# Patient Record
Sex: MALE | Race: WHITE | NOT HISPANIC OR LATINO | URBAN - METROPOLITAN AREA
[De-identification: names, ages, dates, MRNs, and addresses within clinical notes are randomized per-mention and may not be internally consistent; named-entity substitution may affect disease eponyms.]

---
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
## 2021-09-10 ENCOUNTER — TRANSCRIPTION ENCOUNTER (OUTPATIENT)
Age: 21
End: 2021-09-10

## 2021-09-18 ENCOUNTER — TRANSCRIPTION ENCOUNTER (OUTPATIENT)
Age: 21
End: 2021-09-18

## 2021-10-02 ENCOUNTER — APPOINTMENT (OUTPATIENT)
Dept: DISASTER EMERGENCY | Facility: OTHER | Age: 21
End: 2021-10-02

## 2021-10-16 ENCOUNTER — TRANSCRIPTION ENCOUNTER (OUTPATIENT)
Age: 21
End: 2021-10-16

## 2021-10-23 ENCOUNTER — APPOINTMENT (OUTPATIENT)
Dept: DISASTER EMERGENCY | Facility: OTHER | Age: 21
End: 2021-10-23

## 2021-10-23 ENCOUNTER — TRANSCRIPTION ENCOUNTER (OUTPATIENT)
Age: 21
End: 2021-10-23

## 2023-01-23 ENCOUNTER — APPOINTMENT (OUTPATIENT)
Dept: OTOLARYNGOLOGY | Facility: CLINIC | Age: 23
End: 2023-01-23
Payer: COMMERCIAL

## 2023-01-23 VITALS
HEART RATE: 102 BPM | OXYGEN SATURATION: 99 % | BODY MASS INDEX: 36.96 KG/M2 | DIASTOLIC BLOOD PRESSURE: 68 MMHG | WEIGHT: 230 LBS | TEMPERATURE: 97.4 F | RESPIRATION RATE: 14 BRPM | HEIGHT: 66 IN | SYSTOLIC BLOOD PRESSURE: 110 MMHG

## 2023-01-23 DIAGNOSIS — F41.9 ANXIETY DISORDER, UNSPECIFIED: ICD-10-CM

## 2023-01-23 DIAGNOSIS — R51.9 HEADACHE, UNSPECIFIED: ICD-10-CM

## 2023-01-23 DIAGNOSIS — Z72.0 TOBACCO USE: ICD-10-CM

## 2023-01-23 DIAGNOSIS — F32.A ANXIETY DISORDER, UNSPECIFIED: ICD-10-CM

## 2023-01-23 DIAGNOSIS — Z83.49 FAMILY HISTORY OF OTHER ENDOCRINE, NUTRITIONAL AND METABOLIC DISEASES: ICD-10-CM

## 2023-01-23 DIAGNOSIS — Z78.9 OTHER SPECIFIED HEALTH STATUS: ICD-10-CM

## 2023-01-23 DIAGNOSIS — R06.89 OTHER ABNORMALITIES OF BREATHING: ICD-10-CM

## 2023-01-23 DIAGNOSIS — H60.93 UNSPECIFIED OTITIS EXTERNA, BILATERAL: ICD-10-CM

## 2023-01-23 DIAGNOSIS — H93.13 TINNITUS, BILATERAL: ICD-10-CM

## 2023-01-23 DIAGNOSIS — Z82.0 FAMILY HISTORY OF EPILEPSY AND OTHER DISEASES OF THE NERVOUS SYSTEM: ICD-10-CM

## 2023-01-23 PROCEDURE — 99205 OFFICE O/P NEW HI 60 MIN: CPT | Mod: 25

## 2023-01-23 PROCEDURE — G0268 REMOVAL OF IMPACTED WAX MD: CPT

## 2023-01-23 PROCEDURE — 31231 NASAL ENDOSCOPY DX: CPT

## 2023-01-23 NOTE — PHYSICAL EXAM
[] : septum deviated to the left [Midline] : trachea located in midline position [Normal] : no rashes [FreeTextEntry1] : Unable to breathe through nose tinnitus [de-identified] : Bilateral cerumen impactrion [de-identified] : Hypertrophy

## 2023-01-23 NOTE — CONSULT LETTER
[Dear  ___] : Dear  [unfilled], [Courtesy Letter:] : I had the pleasure of seeing your patient, [unfilled], in my office today. [Please see my note below.] : Please see my note below. [Sincerely,] : Sincerely, [FreeTextEntry2] : Jonna Tapia [FreeTextEntry3] : Alin Fountain MD, PATRIC, FACS\par  Department Otolaryngology\par Director of [facility]\par Professor of Otolaryngology, \par [employer]\par

## 2023-01-23 NOTE — HISTORY OF PRESENT ILLNESS
[Facial Pain] : facial pain [Headache] : headache [Facial Pressure] : facial pressure [de-identified] : 23 year old male here for nasal congestion, sinus headaches, intermittent bilateral tinnitus.  States has had symptoms for years.  Denies sinus pain, sinus pressure, post nasal drip or nasal discharge.  Denies sinus infections in the past year.  \par States intermittent bilateral tinnitus for years.  Patient denies otalgia, otorrhea, ear infections, hearing loss, dizziness, vertigo, headaches related to hearing.  No family history of hearing loss. \par

## 2023-01-23 NOTE — REASON FOR VISIT
[Initial Evaluation] : an initial evaluation for [FreeTextEntry2] : here for nasal congestion, sinus headaches, intermittent bilateral tinnitus

## 2023-01-23 NOTE — PROCEDURE
[Video Captured] : video captured and filed [Topical Lidocaine] : topical lidocaine [Oxymetazoline HCl] : oxymetazoline HCl [Flexible Endoscope] : examined with the flexible endoscope [Serial Number: ___] : Serial Number: [unfilled] [Congested] : congested [___ % Obstructed] : [unfilled]U% obstructed [Deviated to the Lt] : deviated to the left [Normal] : the paranasal sinuses had no abnormalities [Image(s) Captured] : image(s) captured and filed [Risk and Benefits Discussed] : The purpose, risks, discomforts, benefits and alternatives of the procedure have been explained to the patient including no treatment. [Cerumen Impaction] : Cerumen Impaction [] : Removal of Cerumen [FreeTextEntry1] : Patient complains of bilateral tinnitus found to have a bilateral cerumen impaction as well as external otitis after cerumen was removed [FreeTextEntry6] : Cerumen removed with a combination of ear speculum wax loop magnification and irrigation external canals were inflamed

## 2023-01-31 ENCOUNTER — APPOINTMENT (OUTPATIENT)
Dept: CT IMAGING | Facility: HOSPITAL | Age: 23
End: 2023-01-31
Payer: COMMERCIAL

## 2023-01-31 ENCOUNTER — OUTPATIENT (OUTPATIENT)
Dept: OUTPATIENT SERVICES | Facility: HOSPITAL | Age: 23
LOS: 1 days | End: 2023-01-31
Payer: COMMERCIAL

## 2023-01-31 DIAGNOSIS — R51.9 HEADACHE, UNSPECIFIED: ICD-10-CM

## 2023-01-31 DIAGNOSIS — J34.2 DEVIATED NASAL SEPTUM: ICD-10-CM

## 2023-01-31 PROCEDURE — 70486 CT MAXILLOFACIAL W/O DYE: CPT | Mod: 26

## 2023-01-31 PROCEDURE — 70486 CT MAXILLOFACIAL W/O DYE: CPT

## 2023-02-02 RX ORDER — FLUTICASONE PROPIONATE 50 UG/1
50 SPRAY, METERED NASAL DAILY
Qty: 1 | Refills: 5 | Status: ACTIVE | COMMUNITY
Start: 2023-01-23 | End: 1900-01-01

## 2023-02-07 RX ORDER — SULFACETAMIDE SODIUM AND PREDNISOLONE SODIUM PHOSPHATE 100; 2.3 MG/ML; MG/ML
10-0.23 SOLUTION/ DROPS OPHTHALMIC TWICE DAILY
Qty: 1 | Refills: 4 | Status: DISCONTINUED | COMMUNITY
Start: 2023-02-02 | End: 2023-02-07

## 2023-02-07 RX ORDER — CIPROFLOXACIN AND DEXAMETHASONE 3; 1 MG/ML; MG/ML
0.3-0.1 SUSPENSION/ DROPS AURICULAR (OTIC) TWICE DAILY
Qty: 1 | Refills: 4 | Status: ACTIVE | COMMUNITY
Start: 2023-01-23 | End: 1900-01-01

## 2023-03-03 ENCOUNTER — APPOINTMENT (OUTPATIENT)
Dept: OTOLARYNGOLOGY | Facility: CLINIC | Age: 23
End: 2023-03-03
Payer: COMMERCIAL

## 2023-03-03 VITALS
HEART RATE: 90 BPM | SYSTOLIC BLOOD PRESSURE: 114 MMHG | BODY MASS INDEX: 39.24 KG/M2 | DIASTOLIC BLOOD PRESSURE: 80 MMHG | WEIGHT: 250 LBS | HEIGHT: 67 IN

## 2023-03-03 PROCEDURE — 99214 OFFICE O/P EST MOD 30 MIN: CPT | Mod: 25,57

## 2023-03-03 PROCEDURE — 31231 NASAL ENDOSCOPY DX: CPT

## 2023-03-03 PROCEDURE — 92557 COMPREHENSIVE HEARING TEST: CPT

## 2023-03-03 PROCEDURE — 92567 TYMPANOMETRY: CPT

## 2023-03-03 NOTE — HISTORY OF PRESENT ILLNESS
[de-identified] : 23 year old male follow up for nasal congestion, sinus headaches, intermittent bilateral tinnitus.   has been using the ear drops since last visit Jan 23, 2023.    ears are still itchy.   has not had a chance to get the hearing test.     has been using the Flonase daily for the past 4 weeks.  feels the Flonase has provided a small improvement in nasal symptoms. \par Cat scan 1/31/23 IMPRESSION:\par 1. Paranasal sinus mucosal thickening.\par 2. Narrowing of sinus drainage pathways.\par 3. Leftward nasal septal deviation.

## 2023-03-03 NOTE — PHYSICAL EXAM
[] : septum deviated to the left [Midline] : trachea located in midline position [Normal] : no rashes [FreeTextEntry1] : Unable to breathe through nose tinnitus [de-identified] : Bilateral cerumen impactrion [de-identified] : Hypertrophy, R middle turbinate ruth bulllosa

## 2023-03-03 NOTE — PROCEDURE
[Image(s) Captured] : image(s) captured and filed [Video Captured] : video captured and filed [Topical Lidocaine] : topical lidocaine [Oxymetazoline HCl] : oxymetazoline HCl [Flexible Endoscope] : examined with the flexible endoscope [Serial Number: ___] : Serial Number: [unfilled] [Recalcitrant Symptoms] : recalcitrant symptoms  [Anterior rhinoscopy insufficient to account for symptoms] : anterior rhinoscopy insufficient to account for symptoms [Congested] : congested [Nasal Mucosa Crusts / Sores] : no lesions [Nasal Mucosa] : no polyps [___ % Obstructed] : [unfilled]U% obstructed [Deviated to the Lt] : deviated to the left [Nasal Septum] : no lesions [Nasal Septum Mucosa Bleeding] : no bleeding [Normal] : the middle meatus had no abnormalities [FreeTextEntry6] : Pre-op indication(s): nasal congestion, sinus headaches\par Post-op indication(s): deviated septum to the L, R middle turbinate ruth bullosa\par Verbal consent obtained from patient.\par “Anterior rhinoscopy insufficient to account for symptoms” \par Details for procedure: \par Scope #: [id number]\par Type of scope:    flexible fiber optic telescope  X   Rigid glass telescope \par Anesthesia and/or vasoconstriction was achieved topically by using: \par 4% Lidocaine spray   0.05% Oxymetazoline     Other ______ \par The following anatomic sites were directly examined in a sequential fashion: \par The scope was introduced in the nasal passage between the middle and inferior turbinates to exam the inferior portion of the middle meatus and the fontanelle, as well as the maxillary ostia. Next, the scope was passed medially and posteriorly to the middle turbinates to examine the sphenoethmoid recess and the superior turbinate region. \par Upon visualization the finders are as follows: \par Nasal Septum:   Deviated to   left  \par Bleeding site cauterized:    Anterior   left   right   Posterior   left   right \par Method:   Silver Nitrate   YAG Laser    Electrocautery ______ \par Right Side: \par * Mucosa: Normal\par * Mucous: Normal\par * Polyp: Normal\par * Inferior Turbinate: hypertrophy\par * Middle Turbinate: hypertrophy\par * Superior Turbinate: Normal\par * Inferior Meatus: Normal\par * Middle Meatus: Normal\par * Super Meatus: Normal\par * Sphenoethmoidal Recess: Normal\par Left Side: \par * Mucosa: Normal\par * Mucous: Normal\par * Polyp: Normal\par * Inferior Turbinate: hypertrophy\par * Middle Turbinate: Normal\par * Superior Turbinate: Normal\par * Inferior Meatus: Normal\par * Middle Meatus: Normal\par * Super Meatus: Normal\par * Sphenoethmoidal Recess: Normal\par The patient tolerated the procedure well without any complications.\par \par   [FreeTextEntry5] : sinus drainage regions narrowed

## 2023-03-03 NOTE — CONSULT LETTER
[Dear  ___] : Dear  [unfilled], [Courtesy Letter:] : I had the pleasure of seeing your patient, [unfilled], in my office today. [Please see my note below.] : Please see my note below. [Sincerely,] : Sincerely, [FreeTextEntry2] : Jonna Tapia  [FreeTextEntry3] : Alin Fountain MD, PATRIC, FACS\par  Department Otolaryngology\par Director of [facility]\par Professor of Otolaryngology, \par [employer]/\[employer]"" [employer]\par

## 2023-03-03 NOTE — REASON FOR VISIT
[Subsequent Evaluation] : a subsequent evaluation for [FreeTextEntry2] : follow up for nasal congestion, sinus headaches, intermittent bilateral tinnitus

## 2023-04-06 ENCOUNTER — NON-APPOINTMENT (OUTPATIENT)
Age: 23
End: 2023-04-06

## 2023-05-09 ENCOUNTER — OUTPATIENT (OUTPATIENT)
Dept: OUTPATIENT SERVICES | Facility: HOSPITAL | Age: 23
LOS: 1 days | End: 2023-05-09

## 2023-05-09 VITALS
WEIGHT: 227.08 LBS | SYSTOLIC BLOOD PRESSURE: 114 MMHG | DIASTOLIC BLOOD PRESSURE: 74 MMHG | HEIGHT: 66.5 IN | TEMPERATURE: 98 F | OXYGEN SATURATION: 96 % | HEART RATE: 82 BPM | RESPIRATION RATE: 16 BRPM

## 2023-05-09 DIAGNOSIS — J34.2 DEVIATED NASAL SEPTUM: ICD-10-CM

## 2023-05-09 DIAGNOSIS — J32.9 CHRONIC SINUSITIS, UNSPECIFIED: ICD-10-CM

## 2023-05-09 LAB
HCT VFR BLD CALC: 49.8 % — SIGNIFICANT CHANGE UP (ref 39–50)
HGB BLD-MCNC: 16.4 G/DL — SIGNIFICANT CHANGE UP (ref 13–17)
MCHC RBC-ENTMCNC: 27.8 PG — SIGNIFICANT CHANGE UP (ref 27–34)
MCHC RBC-ENTMCNC: 32.9 GM/DL — SIGNIFICANT CHANGE UP (ref 32–36)
MCV RBC AUTO: 84.6 FL — SIGNIFICANT CHANGE UP (ref 80–100)
NRBC # BLD: 0 /100 WBCS — SIGNIFICANT CHANGE UP (ref 0–0)
NRBC # FLD: 0 K/UL — SIGNIFICANT CHANGE UP (ref 0–0)
PLATELET # BLD AUTO: 290 K/UL — SIGNIFICANT CHANGE UP (ref 150–400)
RBC # BLD: 5.89 M/UL — HIGH (ref 4.2–5.8)
RBC # FLD: 12.3 % — SIGNIFICANT CHANGE UP (ref 10.3–14.5)
WBC # BLD: 5.96 K/UL — SIGNIFICANT CHANGE UP (ref 3.8–10.5)
WBC # FLD AUTO: 5.96 K/UL — SIGNIFICANT CHANGE UP (ref 3.8–10.5)

## 2023-05-09 NOTE — H&P PST ADULT - ASSESSMENT
frontal sinusitis deviated septum. patient is scheduled for septoplasty turbinectomy endoscopic CT guided functional endoscopic sinus surgery    frontal sinusitis deviated septum. Patient is scheduled for septoplasty turbinectomy endoscopic CT guided functional endoscopic sinus surgery

## 2023-05-09 NOTE — H&P PST ADULT - HISTORY OF PRESENT ILLNESS
Patient is 23 year old male with pre op dx of chronic frontal sinusitis deviated septum. patient is scheduled for septoplasty turbinectomy endoscopic CT guided functional endoscopic sinus surgery

## 2023-05-09 NOTE — H&P PST ADULT - NECK
normal/supple/symmetric/no tracheal deviation/no thyromegaly/no palpable masses Mallampati Score I/normal/supple/symmetric/no tracheal deviation/no thyromegaly/no palpable masses

## 2023-05-09 NOTE — H&P PST ADULT - NSICDXPASTMEDICALHX_GEN_ALL_CORE_FT
PAST MEDICAL HISTORY:  Chronic frontal sinusitis     Chronic sinusitis     Chronic sphenoidal sinusitis     Deviated septum

## 2023-05-09 NOTE — H&P PST ADULT - GENITOURINARY
· CKD 3a stable    Results from last 7 days   Lab Units 04/27/21  0544 04/26/21  0601 04/25/21  1243   BUN mg/dL 14 17 21   CREATININE mg/dL 1 05 1 05 1 29*   EGFR ml/min/1 73sq m 51 51 40 negative

## 2023-05-09 NOTE — H&P PST ADULT - PROBLEM SELECTOR PLAN 1
Patient tentatively scheduled for septoplasty turbinectomy endoscopic CT guided functional endoscopic sinus surgery     Pre-op instructions provided. Pt given verbal and written instructions with teach back on Pepcid. Pt verbalized understanding with return demonstration.    Pt has a scheduled preop COVID test.

## 2023-05-17 ENCOUNTER — APPOINTMENT (OUTPATIENT)
Dept: CT IMAGING | Facility: CLINIC | Age: 23
End: 2023-05-17
Payer: COMMERCIAL

## 2023-05-17 ENCOUNTER — OUTPATIENT (OUTPATIENT)
Dept: OUTPATIENT SERVICES | Facility: HOSPITAL | Age: 23
LOS: 1 days | End: 2023-05-17
Payer: COMMERCIAL

## 2023-05-17 ENCOUNTER — TRANSCRIPTION ENCOUNTER (OUTPATIENT)
Age: 23
End: 2023-05-17

## 2023-05-17 VITALS
SYSTOLIC BLOOD PRESSURE: 114 MMHG | DIASTOLIC BLOOD PRESSURE: 74 MMHG | HEART RATE: 82 BPM | TEMPERATURE: 98 F | OXYGEN SATURATION: 96 % | RESPIRATION RATE: 16 BRPM | HEIGHT: 66.5 IN | WEIGHT: 227.08 LBS

## 2023-05-17 DIAGNOSIS — R51.9 HEADACHE, UNSPECIFIED: ICD-10-CM

## 2023-05-17 DIAGNOSIS — R09.81 NASAL CONGESTION: ICD-10-CM

## 2023-05-17 DIAGNOSIS — J34.2 DEVIATED NASAL SEPTUM: ICD-10-CM

## 2023-05-17 PROBLEM — J32.3 CHRONIC SPHENOIDAL SINUSITIS: Chronic | Status: ACTIVE | Noted: 2023-05-09

## 2023-05-17 PROBLEM — J32.1 CHRONIC FRONTAL SINUSITIS: Chronic | Status: ACTIVE | Noted: 2023-05-09

## 2023-05-17 PROBLEM — J32.9 CHRONIC SINUSITIS, UNSPECIFIED: Chronic | Status: ACTIVE | Noted: 2023-05-09

## 2023-05-17 PROCEDURE — 70486 CT MAXILLOFACIAL W/O DYE: CPT

## 2023-05-17 PROCEDURE — 70486 CT MAXILLOFACIAL W/O DYE: CPT | Mod: 26

## 2023-05-17 NOTE — ASU PREOPERATIVE ASSESSMENT, ADULT ([facility] ONLY) - FALL HARM RISK - UNIVERSAL INTERVENTIONS
Bed in lowest position, wheels locked, appropriate side rails in place/Call bell, personal items and telephone in reach/Instruct patient to call for assistance before getting out of bed or chair/Non-slip footwear when patient is out of bed/[place] to call system/Physically safe environment - no spills, clutter or unnecessary equipment/Purposeful Proactive Rounding/Room/bathroom lighting operational, light cord in reach

## 2023-05-18 ENCOUNTER — APPOINTMENT (OUTPATIENT)
Dept: OTOLARYNGOLOGY | Facility: AMBULATORY SURGERY CENTER | Age: 23
End: 2023-05-18

## 2023-05-18 ENCOUNTER — OUTPATIENT (OUTPATIENT)
Dept: OUTPATIENT SERVICES | Facility: HOSPITAL | Age: 23
LOS: 1 days | Discharge: HOME | End: 2023-05-18
Payer: COMMERCIAL

## 2023-05-18 ENCOUNTER — TRANSCRIPTION ENCOUNTER (OUTPATIENT)
Age: 23
End: 2023-05-18

## 2023-05-18 ENCOUNTER — RESULT REVIEW (OUTPATIENT)
Age: 23
End: 2023-05-18

## 2023-05-18 VITALS
TEMPERATURE: 99 F | SYSTOLIC BLOOD PRESSURE: 128 MMHG | OXYGEN SATURATION: 97 % | HEART RATE: 91 BPM | DIASTOLIC BLOOD PRESSURE: 58 MMHG | RESPIRATION RATE: 16 BRPM

## 2023-05-18 DIAGNOSIS — J34.2 DEVIATED NASAL SEPTUM: ICD-10-CM

## 2023-05-18 LAB
GRAM STN FLD: SIGNIFICANT CHANGE UP
SPECIMEN SOURCE: SIGNIFICANT CHANGE UP

## 2023-05-18 PROCEDURE — 30520 REPAIR OF NASAL SEPTUM: CPT | Mod: GC

## 2023-05-18 PROCEDURE — 88305 TISSUE EXAM BY PATHOLOGIST: CPT | Mod: 26

## 2023-05-18 PROCEDURE — 31288 NASAL/SINUS ENDOSCOPY SURG: CPT | Mod: GC,LT

## 2023-05-18 PROCEDURE — 61782 SCAN PROC CRANIAL EXTRA: CPT | Mod: GC

## 2023-05-18 PROCEDURE — 30130 EXCISE INFERIOR TURBINATE: CPT | Mod: 50,GC

## 2023-05-18 PROCEDURE — 31253 NSL/SINS NDSC TOTAL: CPT | Mod: GC,LT

## 2023-05-18 PROCEDURE — 88304 TISSUE EXAM BY PATHOLOGIST: CPT | Mod: 26

## 2023-05-18 PROCEDURE — 31267 ENDOSCOPY MAXILLARY SINUS: CPT | Mod: 50,GC

## 2023-05-18 PROCEDURE — 88311 DECALCIFY TISSUE: CPT | Mod: 26

## 2023-05-18 DEVICE — SURGIFLO MATRIX WITH THROMBIN KIT: Type: IMPLANTABLE DEVICE | Status: FUNCTIONAL

## 2023-05-18 DEVICE — STENT SINUS DRUG ELUDING PROPEL CONTOUR: Type: IMPLANTABLE DEVICE | Status: FUNCTIONAL

## 2023-05-18 DEVICE — SEEKR BLN EM FRNT 70 DEG 7X7MM: Type: IMPLANTABLE DEVICE | Status: FUNCTIONAL

## 2023-05-18 DEVICE — STENT DRUG ELUTING SINUS INTERSECT ENT PROPEL MINI 16MM: Type: IMPLANTABLE DEVICE | Status: FUNCTIONAL

## 2023-05-18 DEVICE — STENT DRUG ELUTING SINUS BIO ABSORB INTERSECT ENT PROPEL 23MM: Type: IMPLANTABLE DEVICE | Status: FUNCTIONAL

## 2023-05-18 RX ORDER — OXYCODONE AND ACETAMINOPHEN 5; 325 MG/1; MG/1
5-325 TABLET ORAL
Qty: 10 | Refills: 0 | Status: ACTIVE | COMMUNITY
Start: 2023-05-18 | End: 1900-01-01

## 2023-05-18 RX ORDER — CEPHALEXIN 500 MG/1
500 TABLET ORAL
Qty: 14 | Refills: 0 | Status: ACTIVE | COMMUNITY
Start: 2023-05-18 | End: 1900-01-01

## 2023-05-18 NOTE — ASU DISCHARGE PLAN (ADULT/PEDIATRIC) - ASU DC SPECIAL INSTRUCTIONSFT
please come to clinic tomorrow to get your nasal packing removed    take percocet and keflex for antibiotics and pain

## 2023-05-18 NOTE — ASU DISCHARGE PLAN (ADULT/PEDIATRIC) - NURSING INSTRUCTIONS
Please do not take tylenol AND percocet/vicodin/excedrin as narcotic prescription already contains tylenol.

## 2023-05-18 NOTE — ASU DISCHARGE PLAN (ADULT/PEDIATRIC) - NS MD DC FALL RISK RISK
For information on Fall & Injury Prevention, visit: [email or web address] OR  [email or web address] OR  https://www.cdc.gov/steadi/patient.html

## 2023-05-18 NOTE — ASU DISCHARGE PLAN (ADULT/PEDIATRIC) - CALL YOUR DOCTOR IF YOU HAVE ANY OF THE FOLLOWING:
Bleeding that does not stop/Pain not relieved by Medications Bleeding that does not stop/Pain not relieved by Medications/Fever greater than (need to indicate Fahrenheit or Celsius)/Wound/Surgical Site with redness, or foul smelling discharge or pus/Nausea and vomiting that does not stop

## 2023-05-19 ENCOUNTER — APPOINTMENT (OUTPATIENT)
Dept: OTOLARYNGOLOGY | Facility: CLINIC | Age: 23
End: 2023-05-19
Payer: COMMERCIAL

## 2023-05-19 VITALS — TEMPERATURE: 98.2 F

## 2023-05-19 PROCEDURE — 99024 POSTOP FOLLOW-UP VISIT: CPT

## 2023-05-19 RX ORDER — OXYCODONE 5 MG/1
5 TABLET ORAL EVERY 6 HOURS
Qty: 2 | Refills: 0 | Status: ACTIVE | COMMUNITY
Start: 2023-05-19 | End: 1900-01-01

## 2023-05-19 NOTE — HISTORY OF PRESENT ILLNESS
[de-identified] : 22 yo s/p septoplasty, turbinectomy and endoscopic ct guided fess  on 5/18/2023. Presents today for nasal packing removal . Reports dry mouth, pain controlled with medication.

## 2023-05-23 LAB
CULTURE RESULTS: SIGNIFICANT CHANGE UP
SPECIMEN SOURCE: SIGNIFICANT CHANGE UP

## 2023-05-24 ENCOUNTER — APPOINTMENT (OUTPATIENT)
Dept: OTOLARYNGOLOGY | Facility: CLINIC | Age: 23
End: 2023-05-24
Payer: COMMERCIAL

## 2023-05-24 VITALS
SYSTOLIC BLOOD PRESSURE: 116 MMHG | DIASTOLIC BLOOD PRESSURE: 70 MMHG | OXYGEN SATURATION: 98 % | TEMPERATURE: 97.1 F | HEIGHT: 67 IN | HEART RATE: 88 BPM | WEIGHT: 250 LBS | BODY MASS INDEX: 39.24 KG/M2

## 2023-05-24 DIAGNOSIS — J34.2 DEVIATED NASAL SEPTUM: ICD-10-CM

## 2023-05-24 PROCEDURE — 99024 POSTOP FOLLOW-UP VISIT: CPT

## 2023-05-24 NOTE — ASSESSMENT
[FreeTextEntry1] : -  Continue saline spray\par -  Patient is on a flight next week and will follow up in 2 weeks for debridement.

## 2023-05-24 NOTE — REVIEW OF SYSTEMS
[As Noted in HPI] : as noted in HPI [Nasal Congestion] : nasal congestion [Nose Bleeds] : no nose bleeds [Negative] : Heme/Lymph

## 2023-05-24 NOTE — HISTORY OF PRESENT ILLNESS
[de-identified] : Mr. YANEZ is a 23 year male who presents one week post septoplasty, turbinectomy, FESS.  He is doing well and is without any complaints.  He reports that he has been using saline rinses every day and has completed his antibiotics.  Denies any pain or discomfort.

## 2023-05-24 NOTE — PHYSICAL EXAM
[Normal] : no abnormal secretions [de-identified] : nasal splints intact, normal dried nasal debris - all removed.

## 2023-06-02 LAB — SURGICAL PATHOLOGY STUDY: SIGNIFICANT CHANGE UP

## 2023-06-09 ENCOUNTER — APPOINTMENT (OUTPATIENT)
Dept: OTOLARYNGOLOGY | Facility: CLINIC | Age: 23
End: 2023-06-09
Payer: COMMERCIAL

## 2023-06-09 DIAGNOSIS — J31.0 CHRONIC RHINITIS: ICD-10-CM

## 2023-06-09 PROCEDURE — 99024 POSTOP FOLLOW-UP VISIT: CPT

## 2023-06-09 RX ORDER — MUPIROCIN 20 MG/G
2 OINTMENT TOPICAL TWICE DAILY
Qty: 1 | Refills: 3 | Status: ACTIVE | COMMUNITY
Start: 2023-06-09 | End: 1900-01-01

## 2023-06-09 RX ORDER — BUDESONIDE 0.5 MG/2ML
0.5 INHALANT ORAL
Qty: 1 | Refills: 11 | Status: ACTIVE | COMMUNITY
Start: 2023-06-09 | End: 1900-01-01

## 2023-06-09 NOTE — HISTORY OF PRESENT ILLNESS
[de-identified] : pt states he has had stents  coming from nose and he feels crusting. pt admits to using saline rinse and spray diligently since surgery

## 2023-06-09 NOTE — REASON FOR VISIT
[FreeTextEntry2] : nasal coongestion [FreeTextEntry1] : s/p septoplasty and turbinectomy s/p Endoscopic Sinus Surgery

## 2023-06-17 LAB
CULTURE RESULTS: SIGNIFICANT CHANGE UP
SPECIMEN SOURCE: SIGNIFICANT CHANGE UP

## 2024-04-01 ENCOUNTER — NON-APPOINTMENT (OUTPATIENT)
Age: 24
End: 2024-04-01

## 2024-04-30 ENCOUNTER — APPOINTMENT (OUTPATIENT)
Dept: PEDIATRIC ALLERGY IMMUNOLOGY | Facility: CLINIC | Age: 24
End: 2024-04-30
Payer: COMMERCIAL

## 2024-04-30 VITALS
HEART RATE: 83 BPM | DIASTOLIC BLOOD PRESSURE: 70 MMHG | OXYGEN SATURATION: 97 % | RESPIRATION RATE: 16 BRPM | SYSTOLIC BLOOD PRESSURE: 122 MMHG | WEIGHT: 219.8 LBS | HEIGHT: 66.5 IN | BODY MASS INDEX: 34.91 KG/M2

## 2024-04-30 DIAGNOSIS — Z91.09 OTHER ALLERGY STATUS, OTHER THAN TO DRUGS AND BIOLOGICAL SUBSTANCES: ICD-10-CM

## 2024-04-30 DIAGNOSIS — J33.9 NASAL POLYP, UNSPECIFIED: ICD-10-CM

## 2024-04-30 DIAGNOSIS — R09.81 NASAL CONGESTION: ICD-10-CM

## 2024-04-30 PROCEDURE — 99203 OFFICE O/P NEW LOW 30 MIN: CPT

## 2024-04-30 NOTE — REVIEW OF SYSTEMS
[Eye Discharge] : eye discharge [Eye Redness] : redness [Eye Itching] : itchy eyes [Swollen Eyelids] : ~T ~L swollen eyelids [Nasal Congestion] : nasal congestion [Nasal Itching] : nasal itching [Throat Itching] : throat itching [Headache] : headache [Pruritus] : pruritus [Nl] : Gastrointestinal

## 2024-04-30 NOTE — HISTORY OF PRESENT ILLNESS
[Asthma] : asthma [de-identified] : Patient seen in consultation for evaluation of allergic rhinitis.  Patient's symptoms include nasal congestion, runny nose, diffuse itching, itchy throat/mouth, watery eyes, sinus pressure, headaches. These symptoms are year round. Current triggers include exposure to dust mites. The patient has been suffering from these symptoms for several years.  Treatment in the past has included benadryl, sudafed, flonase.    Nasal polyps - yes Sinus surgery - yes, within the last year had a sinoplasty polyp removal, turbinate reduction Allergy testing in the past - yes and did immunotherapy for 1 year with Dr. Gilliam and stopped 1-2 years ago; positive to dust mites  Eczema - as a child  Food allergies - raw oysters causes throat closing, stomach pain about 5-6 months ago; doesn't have an EpiPen; tolerates shrimp

## 2024-04-30 NOTE — SOCIAL HISTORY
[Cat] : cat [Bedroom] : not in the bedroom [Living Area] : not in the living area [Smokers in Household] : there are no smokers in the home

## 2024-04-30 NOTE — PHYSICAL EXAM
[Alert] : alert [Well Nourished] : well nourished [No Acute Distress] : no acute distress [Well Developed] : well developed [No Discharge] : no discharge [Sclera Not Icteric] : sclera not icteric [Normal Rate and Effort] : normal respiratory rhythm and effort [No Crackles] : no crackles [Normal Rate] : heart rate was normal  [Normal S1, S2] : normal S1 and S2 [Regular Rhythm] : with a regular rhythm [Skin Intact] : skin intact  [Conjunctival Erythema] : no conjunctival erythema [Pale mucosa] : no pale mucosa [Boggy Nasal Turbinates] : no boggy and/or pale nasal turbinates [Pharyngeal erythema] : no pharyngeal erythema [Posterior Pharyngeal Cobblestoning] : no posterior pharyngeal cobblestoning [Clear Rhinorrhea] : no clear rhinorrhea was seen [Exudate] : no exudate [Wheezing] : no wheezing was heard

## 2024-05-08 ENCOUNTER — APPOINTMENT (OUTPATIENT)
Dept: PEDIATRIC ALLERGY IMMUNOLOGY | Facility: CLINIC | Age: 24
End: 2024-05-08
Payer: COMMERCIAL

## 2024-05-08 DIAGNOSIS — J31.0 CHRONIC RHINITIS: ICD-10-CM

## 2024-05-08 DIAGNOSIS — Z91.038 OTHER INSECT ALLERGY STATUS: ICD-10-CM

## 2024-05-08 PROCEDURE — 95004 PERQ TESTS W/ALRGNC XTRCS: CPT

## 2024-05-08 NOTE — HISTORY OF PRESENT ILLNESS
[Asthma] : asthma [de-identified] : Patient here for environmental skin testing.   History:  Patient seen in consultation for evaluation of allergic rhinitis.  Patient's symptoms include nasal congestion, runny nose, diffuse itching, itchy throat/mouth, watery eyes, sinus pressure, headaches. These symptoms are year round. Current triggers include exposure to dust mites. The patient has been suffering from these symptoms for several years.  Treatment in the past has included benadryl, sudafed, flonase.    Nasal polyps - yes Sinus surgery - yes, within the last year had a sinoplasty polyp removal, turbinate reduction Allergy testing in the past - yes and did immunotherapy for 1 year with Dr. Gilliam and stopped 1-2 years ago; positive to dust mites  Eczema - as a child  Food allergies - raw oysters causes throat closing, stomach pain about 5-6 months ago; doesn't have an EpiPen; tolerates shrimp

## 2024-05-15 ENCOUNTER — LABORATORY RESULT (OUTPATIENT)
Age: 24
End: 2024-05-15

## 2024-05-22 LAB
A ALTERNATA IGE QN: <0.1 KUA/L
A FUMIGATUS IGE QN: <0.1 KUA/L
BERMUDA GRASS IGE QN: 0.12 KUA/L
BOXELDER IGE QN: 0.12 KUA/L
C HERBARUM IGE QN: <0.1 KUA/L
CALIF WALNUT IGE QN: 0.53 KUA/L
CAT DANDER IGE QN: 8.99 KUA/L
CMN PIGWEED IGE QN: 0.14 KUA/L
COMMON RAGWEED IGE QN: 0.82 KUA/L
COTTONWOOD IGE QN: <0.1 KUA/L
D FARINAE IGE QN: 12.9 KUA/L
D PTERONYSS IGE QN: 10 KUA/L
DEPRECATED A ALTERNATA IGE RAST QL: 0 (ref 0–?)
DEPRECATED A FUMIGATUS IGE RAST QL: 0 (ref 0–?)
DEPRECATED BERMUDA GRASS IGE RAST QL: NORMAL (ref 0–?)
DEPRECATED BOXELDER IGE RAST QL: NORMAL (ref 0–?)
DEPRECATED C HERBARUM IGE RAST QL: 0 (ref 0–?)
DEPRECATED CAT DANDER IGE RAST QL: 3 (ref 0–?)
DEPRECATED COMMON PIGWEED IGE RAST QL: NORMAL (ref 0–?)
DEPRECATED COMMON RAGWEED IGE RAST QL: 2 (ref 0–?)
DEPRECATED COTTONWOOD IGE RAST QL: 0 (ref 0–?)
DEPRECATED D FARINAE IGE RAST QL: 3 (ref 0–?)
DEPRECATED D PTERONYSS IGE RAST QL: 3 (ref 0–?)
DEPRECATED DOG DANDER IGE RAST QL: 3 (ref 0–?)
DEPRECATED GOOSEFOOT IGE RAST QL: NORMAL (ref 0–?)
DEPRECATED LONDON PLANE IGE RAST QL: 0 (ref 0–?)
DEPRECATED MOUSE URINE PROT IGE RAST QL: NORMAL (ref 0–?)
DEPRECATED MUGWORT IGE RAST QL: NORMAL (ref 0–?)
DEPRECATED P NOTATUM IGE RAST QL: 0 (ref 0–?)
DEPRECATED RED CEDAR IGE RAST QL: NORMAL (ref 0–?)
DEPRECATED ROACH IGE RAST QL: 2 (ref 0–?)
DEPRECATED SHEEP SORREL IGE RAST QL: 0 (ref 0–?)
DEPRECATED SILVER BIRCH IGE RAST QL: 2 (ref 0–?)
DEPRECATED TIMOTHY IGE RAST QL: 0 (ref 0–?)
DEPRECATED WHITE ASH IGE RAST QL: 0 (ref 0–?)
DEPRECATED WHITE OAK IGE RAST QL: 1 (ref 0–?)
DOG DANDER IGE QN: 9.48 KUA/L
GOOSEFOOT IGE QN: 0.1 KUA/L
LONDON PLANE IGE QN: <0.1 KUA/L
MOUSE URINE PROT IGE QN: 0.21 KUA/L
MUGWORT IGE QN: 0.2 KUA/L
MULBERRY (T70) CLASS: 0 (ref 0–?)
MULBERRY (T70) CONC: <0.1 KUA/L
P NOTATUM IGE QN: <0.1 KUA/L
RED CEDAR IGE QN: 0.14 KUA/L
ROACH IGE QN: 2.32 KUA/L
SHEEP SORREL IGE QN: <0.1 KUA/L
SILVER BIRCH IGE QN: 1.65 KUA/L
TIMOTHY IGE QN: <0.1 KUA/L
TOTAL IGE SMQN RAST: 410 KU/L
TREE ALLERG MIX1 IGE QL: 1 (ref 0–?)
WHITE ASH IGE QN: <0.1 KUA/L
WHITE ELM IGE QN: 0.82 KUA/L
WHITE ELM IGE QN: 2 (ref 0–?)
WHITE OAK IGE QN: 0.41 KUA/L

## 2024-07-02 ENCOUNTER — APPOINTMENT (OUTPATIENT)
Dept: PEDIATRIC ALLERGY IMMUNOLOGY | Facility: CLINIC | Age: 24
End: 2024-07-02
Payer: COMMERCIAL

## 2024-07-02 DIAGNOSIS — J30.81 ALLERGIC RHINITIS DUE TO ANIMAL (CAT) (DOG) HAIR AND DANDER: ICD-10-CM

## 2024-07-02 DIAGNOSIS — Z51.6 ENCOUNTER FOR DESENSITIZATION TO ALLERGENS: ICD-10-CM

## 2024-07-02 DIAGNOSIS — J30.1 ALLERGIC RHINITIS DUE TO POLLEN: ICD-10-CM

## 2024-07-02 DIAGNOSIS — J30.89 OTHER ALLERGIC RHINITIS: ICD-10-CM

## 2024-07-02 PROCEDURE — 95117 IMMUNOTHERAPY INJECTIONS: CPT

## 2024-07-09 ENCOUNTER — APPOINTMENT (OUTPATIENT)
Dept: PEDIATRIC ALLERGY IMMUNOLOGY | Facility: CLINIC | Age: 24
End: 2024-07-09

## 2024-07-16 ENCOUNTER — APPOINTMENT (OUTPATIENT)
Dept: PEDIATRIC ALLERGY IMMUNOLOGY | Facility: CLINIC | Age: 24
End: 2024-07-16

## 2024-07-30 ENCOUNTER — APPOINTMENT (OUTPATIENT)
Dept: PEDIATRIC ALLERGY IMMUNOLOGY | Facility: CLINIC | Age: 24
End: 2024-07-30

## 2024-12-29 ENCOUNTER — NON-APPOINTMENT (OUTPATIENT)
Age: 24
End: 2024-12-29

## 2025-02-21 ENCOUNTER — NON-APPOINTMENT (OUTPATIENT)
Age: 25
End: 2025-02-21

## 2025-02-22 ENCOUNTER — NON-APPOINTMENT (OUTPATIENT)
Age: 25
End: 2025-02-22

## 2025-02-25 ENCOUNTER — EMERGENCY (EMERGENCY)
Facility: HOSPITAL | Age: 25
LOS: 1 days | Discharge: HOME | End: 2025-02-25
Attending: EMERGENCY MEDICINE | Admitting: EMERGENCY MEDICINE
Payer: COMMERCIAL

## 2025-02-25 VITALS
RESPIRATION RATE: 15 BRPM | DIASTOLIC BLOOD PRESSURE: 82 MMHG | OXYGEN SATURATION: 95 % | WEIGHT: 244.93 LBS | SYSTOLIC BLOOD PRESSURE: 119 MMHG | HEIGHT: 67 IN | HEART RATE: 89 BPM | TEMPERATURE: 98 F

## 2025-02-25 PROCEDURE — 99284 EMERGENCY DEPT VISIT MOD MDM: CPT

## 2025-02-25 PROCEDURE — 99283 EMERGENCY DEPT VISIT LOW MDM: CPT

## 2025-02-25 NOTE — ED ADULT NURSE NOTE - HIV OFFER
"Due to patient being non-English speaking/uses sign language, an  was used for this visit. Only for face-to-face interpretation by an external agency, date and length of interpretation can be found on the scanned worksheet.     name: Mikaela ([id number])  Agency: [facility] [employer]  Language: Azeri   Telephone number: [facility] [employer]  Type of interpretation: [facility] [employer]    NREQQIC [[id number]]  Chief Complaint   Patient presents with     RECHECK     Follow-up on Weight Management.     Initial BP 96/67 (BP Location: Right arm, Patient Position: Sitting, Cuff Size: Adult Large)   Pulse 79   Ht 1.684 m (5' 6.3\")   Wt 84.4 kg (186 lb 1.6 oz)   BMI 29.77 kg/m   Estimated body mass index is 29.77 kg/m  as calculated from the following:    Height as of this encounter: 1.684 m (5' 6.3\").    Weight as of this encounter: 84.4 kg (186 lb 1.6 oz).  Medication Reconciliation: complete    "
Opt out

## 2025-02-25 NOTE — ED PROVIDER NOTE - OBJECTIVE STATEMENT
25-year-old male no significant past medical history presenting with concerns for constipation.  Patient states that last week he had norovirus with the last episode of diarrhea about Wednesday or Thursday.  He has not eaten much solid food since then until the last day.  He went to an urgent care 3 days ago where they told him that he might be constipated and they are going to give him a stool softener.  They told it does not get better go to the emergency department in the next 2 days.  This morning he woke up with some upper abdominal cramping so he came to the emergency room for evaluation.  He denies having any pain or sensation of having to have a bowel movement with nothing coming out.  He is still passing gas.  There is no nausea vomiting no fevers

## 2025-02-25 NOTE — ED PROVIDER NOTE - NSFOLLOWUPINSTRUCTIONS_ED_ALL_ED_FT
Return to the emergency department should you develop any significant pain or problems with defecation.  Please keep some roughage in your diet for the next few days while your body starts to recover from the norovirus that you experienced.  Follow-up with your primary care doctor in the next 48 hours as needed

## 2025-02-25 NOTE — ED PROVIDER NOTE - PHYSICAL EXAMINATION
Vitals: I have reviewed the patients vital signs  General: nontoxic appearing  HEENT: Atraumatic, normocephalic, airway patent  Eyes: EOMI, tracking appropriately  Neck: no tracheal deviation  Chest/Lungs: no trauma, symmetric chest rise, speaking in complete sentences,  no resp distress  Heart: skin and extremities well perfused, regular rate and rhythm  Neuro: A+Ox3, appears non focal  MSK: no deformities  Skin: no cyanosis, no jaundice   Psych:  Normal mood and affect  Abdomen: Soft nontender nondistended.  Rectal exam chaperone Nilson GARCIA no stool appreciated within the vault.

## 2025-02-25 NOTE — ED PROVIDER NOTE - PATIENT PORTAL LINK FT
You can access the FollowMyHealth Patient Portal offered by [facility] by registering at the following website: [email or web address]. By joining [employer]’s FollowMyHealth portal, you will also be able to view your health information using other applications (apps) compatible with our system.

## 2025-02-25 NOTE — ED PROVIDER NOTE - CLINICAL SUMMARY MEDICAL DECISION MAKING FREE TEXT BOX
Patient presenting with concerns of constipation.  Here in the emergency department there is no stool in the vault.  He is not expressing symptoms either of someone who has an impaction or significant constipation.  The patient most likely is experiencing this lack of bowel movement secondary to the fact that he just had norovirus he is only just starting to eat normal foods again.  This lack of fiber and solid food is going to prevent the formation of stool to begin with.  The patient was educated on what to expect and signs for which to return.

## 2025-02-25 NOTE — ED ADULT NURSE NOTE - NSFALLUNIVINTERV_ED_ALL_ED
Bed/Stretcher in lowest position, wheels locked, appropriate side rails in place/Call bell, personal items and telephone in reach/Instruct patient to call for assistance before getting out of bed/chair/stretcher/Non-slip footwear applied when patient is off stretcher/[place] to call system/Physically safe environment - no spills, clutter or unnecessary equipment/Purposeful proactive rounding/Room/bathroom lighting operational, light cord in reach

## 2025-05-28 ENCOUNTER — EMERGENCY (EMERGENCY)
Facility: HOSPITAL | Age: 25
LOS: 1 days | End: 2025-05-28
Attending: EMERGENCY MEDICINE | Admitting: STUDENT IN AN ORGANIZED HEALTH CARE EDUCATION/TRAINING PROGRAM
Payer: SELF-PAY

## 2025-05-28 VITALS
RESPIRATION RATE: 17 BRPM | SYSTOLIC BLOOD PRESSURE: 125 MMHG | WEIGHT: 250 LBS | HEART RATE: 91 BPM | TEMPERATURE: 98 F | DIASTOLIC BLOOD PRESSURE: 74 MMHG | HEIGHT: 67 IN | OXYGEN SATURATION: 96 %

## 2025-05-28 PROCEDURE — 73610 X-RAY EXAM OF ANKLE: CPT | Mod: 26,LT

## 2025-05-28 PROCEDURE — 99284 EMERGENCY DEPT VISIT MOD MDM: CPT

## 2025-05-28 PROCEDURE — 73610 X-RAY EXAM OF ANKLE: CPT

## 2025-05-28 PROCEDURE — 99283 EMERGENCY DEPT VISIT LOW MDM: CPT

## (undated) DEVICE — MARKING PEN W RULER

## (undated) DEVICE — DRSG TAPE MEDIPORE 1"

## (undated) DEVICE — POSITIONER PATIENT SAFETY STRAP 3X60"

## (undated) DEVICE — SUT PLAIN GUT 4-0 18" SC-1

## (undated) DEVICE — URETERAL CATH RED RUBBER 20FR (YELLOW)

## (undated) DEVICE — DRSG STERISTRIPS 0.5 X 4"

## (undated) DEVICE — SUT CHROMIC 4-0 18" G-3

## (undated) DEVICE — GLV 7.5 PROTEXIS (WHITE)

## (undated) DEVICE — SYR LUER LOK 50CC

## (undated) DEVICE — S&N ARTHROCARE ENT WAND REFLEX ULTRA 45

## (undated) DEVICE — SUT ETHILON 4-0 18" P-3

## (undated) DEVICE — WARMING BLANKET UPPER ADULT

## (undated) DEVICE — SUT CHROMIC 4-0 54" REEL

## (undated) DEVICE — DRSG SPLINT NASAL THERMA MED

## (undated) DEVICE — FRAZIER SUCTION TIP 10FR

## (undated) DEVICE — DRAPE TOWEL BLUE 17" X 24"

## (undated) DEVICE — DRSG MASTISOL

## (undated) DEVICE — TUBING SUCTION NONCONDUCTIVE 6MM X 12FT

## (undated) DEVICE — DRSG TELFA 3 X 8

## (undated) DEVICE — VENODYNE/SCD SLEEVE CALF MEDIUM

## (undated) DEVICE — DRAPE SPLIT SHEET 77" X 108"

## (undated) DEVICE — Device

## (undated) DEVICE — GLV 7 PROTEXIS (WHITE)

## (undated) DEVICE — CONN FEMALE LUER ADAPTOR SM XMAS TREE

## (undated) DEVICE — PACKING GAUZE PLAIN 0.5"

## (undated) DEVICE — STRYKER 4-PORT MANIFOLD W/SPECIMEN COLLECTION

## (undated) DEVICE — SOL IRR POUR H2O 500ML

## (undated) DEVICE — PACK SMR

## (undated) DEVICE — SOL IRR POUR NS 0.9% 500ML

## (undated) DEVICE — DRSG SPLINT INTRA NASAL .5MM OVERSIZE THICK

## (undated) DEVICE — BASIN SET DOUBLE

## (undated) DEVICE — SOL ANTI FOG

## (undated) DEVICE — ELCTR ROCKER SWITCH PENCIL BLUE 10FT

## (undated) DEVICE — DRSG MEROCEL 2000 WITH STRING 8CM

## (undated) DEVICE — WARMING BLANKET LOWER ADULT

## (undated) DEVICE — DRAPE BACK TABLE COVER 44X90"

## (undated) DEVICE — SUT SILK 3-0 18" FS-1

## (undated) DEVICE — SUT CHROMIC 4-0 18" G-2

## (undated) DEVICE — POSITIONER FOAM EGG CRATE ULNAR 2PCS (PINK)

## (undated) DEVICE — SUT VICRYL 4-0 18" P-3

## (undated) DEVICE — PREP BETADINE KIT

## (undated) DEVICE — LABELS BLANK W PEN

## (undated) DEVICE — ELCTR GROUNDING PAD ADULT COVIDIEN

## (undated) DEVICE — DRAPE 3/4 SHEET 52X76"

## (undated) DEVICE — MEDTRONIC INFLATOR KIT FOR NUVENT EM SINUS DILATION SYSTEM

## (undated) DEVICE — SYR LUER LOK 5CC

## (undated) DEVICE — VISITEC 4X4

## (undated) DEVICE — CATH IV SAFE INSYTE 14G X 1.75" (ORANGE)

## (undated) DEVICE — SUT ETHILON 3-0 18" FS-1

## (undated) DEVICE — SOL INJ NS 0.9% 500ML BAG